# Patient Record
Sex: FEMALE | Race: WHITE | NOT HISPANIC OR LATINO | Employment: UNEMPLOYED | URBAN - METROPOLITAN AREA
[De-identification: names, ages, dates, MRNs, and addresses within clinical notes are randomized per-mention and may not be internally consistent; named-entity substitution may affect disease eponyms.]

---

## 2018-01-05 ENCOUNTER — OFFICE VISIT (OUTPATIENT)
Dept: URGENT CARE | Facility: CLINIC | Age: 43
End: 2018-01-05
Payer: COMMERCIAL

## 2018-01-05 PROCEDURE — 99203 OFFICE O/P NEW LOW 30 MIN: CPT

## 2018-01-24 VITALS
BODY MASS INDEX: 32.65 KG/M2 | OXYGEN SATURATION: 99 % | RESPIRATION RATE: 16 BRPM | TEMPERATURE: 99 F | WEIGHT: 196 LBS | SYSTOLIC BLOOD PRESSURE: 123 MMHG | DIASTOLIC BLOOD PRESSURE: 78 MMHG | HEART RATE: 92 BPM | HEIGHT: 65 IN

## 2018-01-24 NOTE — PROGRESS NOTES
Assessment   1  Acute URI (465 9) (J06 [id number])    Discussion/Summary  Discussion Summary: You want to drink copious amounts of fluids, 6 or 8 glasses of water or juice a day  Vaporizer in bedroom could be helpful  Motrin or Aleve for fever chills or aches  Cough medicine as needed  Understands and agrees with treatment plan: The treatment plan was reviewed with the patient/guardian  The patient/guardian understands and agrees with the treatment plan   Counseling Documentation With Imm: The patient was counseled regarding instructions for management  Chief Complaint   1  Cough  Chief Complaint Free Text Note Form: Pt c/o cough, congestion and fatigue X 2 weeks  History of Present Illness  Hospital Based Practices Required Assessment:   Pain Assessment   the patient states they do not have pain  Abuse And Domestic Violence Screen    Yes, the patient is safe at home  Depression And Suicide Screen  No, the patient has not had thoughts of hurting themself  No, the patient has not felt depressed in the past 7 days  Prefered Language is  [place]  Primary Language is  English  Review of Systems  Focused-Female:   ENT: as noted in HPI  Respiratory: as noted in HPI  Past Medical History  Active Problems And Past Medical History Reviewed: The active problems and past medical history were reviewed and updated today  Social History  Social History Reviewed: The social history was reviewed and updated today  Current Meds  1  Mucinex DM  MG Oral Tablet Extended Release 12 Hour; Therapy: (Recorded:05Jan2018) to Recorded  2  Sertraline HCl - 100 MG Oral Tablet; Therapy: (Recorded:05Jan2018) to Recorded    Allergies   1   Penicillins    Vitals  Signs   Recorded: [id number] 01:09PM   Temperature: 99 F  Heart Rate: 92  Respiration: 16  Systolic: 208  Diastolic: 78  Height: 5 ft 5 in  Weight: 196 lb   BMI Calculated: 32 62  BSA Calculated: 1 96  O2 Saturation: 99  Pain Scale: 0    Physical Exam    Eyes   Conjunctiva and lids: No swelling, erythema or discharge  Ears, Nose, Mouth, and Throat   External inspection of ears and nose: Normal     Otoscopic examination: Tympanic membranes translucent with normal light reflex  Canals patent without erythema  Nasal mucosa, septum, and turbinates: Normal without edema or erythema  Oropharynx: Normal with no erythema, edema, exudate or lesions  Pulmonary   Auscultation of lungs: Clear to auscultation  Cardiovascular   Auscultation of heart: Normal rate and rhythm, normal S1 and S2, without murmurs         Signatures   Electronically signed by : SAMEERA Garcias ; Jan 5 2018  1:47PM EST                       (Author)    Electronically signed by : SAMEERA Garcias ; Jan 5 2018  3:27PM EST                       (Author)